# Patient Record
Sex: MALE | Race: WHITE | NOT HISPANIC OR LATINO
[De-identification: names, ages, dates, MRNs, and addresses within clinical notes are randomized per-mention and may not be internally consistent; named-entity substitution may affect disease eponyms.]

---

## 2018-11-07 ENCOUNTER — APPOINTMENT (OUTPATIENT)
Dept: CARDIOLOGY | Facility: CLINIC | Age: 43
End: 2018-11-07
Payer: COMMERCIAL

## 2018-11-07 VITALS
SYSTOLIC BLOOD PRESSURE: 125 MMHG | WEIGHT: 220 LBS | DIASTOLIC BLOOD PRESSURE: 80 MMHG | BODY MASS INDEX: 29.8 KG/M2 | HEIGHT: 72 IN | RESPIRATION RATE: 16 BRPM | HEART RATE: 86 BPM

## 2018-11-07 DIAGNOSIS — Z82.49 FAMILY HISTORY OF ISCHEMIC HEART DISEASE AND OTHER DISEASES OF THE CIRCULATORY SYSTEM: ICD-10-CM

## 2018-11-07 DIAGNOSIS — Z72.820 SLEEP DEPRIVATION: ICD-10-CM

## 2018-11-07 DIAGNOSIS — Z83.438 FAMILY HISTORY OF OTHER DISORDER OF LIPOPROTEIN METABOLISM AND OTHER LIPIDEMIA: ICD-10-CM

## 2018-11-07 DIAGNOSIS — Z78.9 OTHER SPECIFIED HEALTH STATUS: ICD-10-CM

## 2018-11-07 DIAGNOSIS — E53.8 DEFICIENCY OF OTHER SPECIFIED B GROUP VITAMINS: ICD-10-CM

## 2018-11-07 DIAGNOSIS — Z87.891 PERSONAL HISTORY OF NICOTINE DEPENDENCE: ICD-10-CM

## 2018-11-07 PROCEDURE — 93000 ELECTROCARDIOGRAM COMPLETE: CPT

## 2018-11-07 PROCEDURE — 99244 OFF/OP CNSLTJ NEW/EST MOD 40: CPT

## 2018-11-07 RX ORDER — CYANOCOBALAMIN 1000 UG/ML
1000 INJECTION INTRAMUSCULAR; SUBCUTANEOUS
Qty: 2 | Refills: 0 | Status: DISCONTINUED | COMMUNITY
Start: 2018-04-19

## 2018-11-07 RX ORDER — ALBUTEROL SULFATE 90 UG/1
108 (90 BASE) AEROSOL, METERED RESPIRATORY (INHALATION) TWICE DAILY
Refills: 3 | Status: ACTIVE | COMMUNITY
Start: 2018-01-16

## 2018-11-07 RX ORDER — ASCORBIC ACID 500 MG
TABLET ORAL
Refills: 0 | Status: ACTIVE | COMMUNITY

## 2018-11-07 RX ORDER — CHOLECALCIFEROL (VITAMIN D3) 25 MCG
TABLET ORAL
Refills: 0 | Status: ACTIVE | COMMUNITY

## 2018-11-07 RX ORDER — UBIDECARENONE 50 MG
CAPSULE ORAL
Refills: 0 | Status: ACTIVE | COMMUNITY

## 2018-11-07 RX ORDER — CHOLECALCIFEROL (VITAMIN D3) 50 MCG
5000 TABLET ORAL DAILY
Refills: 0 | Status: ACTIVE | COMMUNITY

## 2018-11-07 RX ORDER — ALBUTEROL 90 MCG
AEROSOL (GRAM) INHALATION
Refills: 0 | Status: ACTIVE | COMMUNITY

## 2018-11-07 RX ORDER — MULTIVITAMIN
TABLET ORAL DAILY
Refills: 0 | Status: ACTIVE | COMMUNITY

## 2018-11-07 RX ORDER — RANITIDINE 300 MG/1
300 TABLET ORAL DAILY
Refills: 0 | Status: ACTIVE | COMMUNITY
Start: 2018-10-12

## 2018-11-07 RX ORDER — BUDESONIDE AND FORMOTEROL FUMARATE DIHYDRATE 80; 4.5 UG/1; UG/1
80-4.5 AEROSOL RESPIRATORY (INHALATION)
Qty: 10 | Refills: 0 | Status: DISCONTINUED | COMMUNITY
Start: 2018-01-16

## 2018-11-09 NOTE — HISTORY OF PRESENT ILLNESS
[FreeTextEntry1] : The patient presents here for initial cardiac evaluation. He denies any prior history of cardiac disease.  He has a remote history of tobacco use, 17-pack years, but stopped about 7 years ago. He does have some hyperlipidemia.  He is worried about his heart because many of his coworkers have had cardiac problems.  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   He has some occasional chest tightness that lasts seconds, is nonexertional, nonradiating, and is not associated with any other symptoms.  Where he has no regular exercise regimen, he works fairly strenuously at work.    There is no other known cardiac history.

## 2018-11-09 NOTE — ASSESSMENT
[FreeTextEntry1] : ECG reveals normal sinus rhythm at 86 bpm.  Poor R wave progression anteriorly.  No significant ST-T wave changes.   Laboratory data from 1/18/18 revealed a cholesterol of 236, HDL 66, , triglycerides 85, and hemoglobin 14.7.    Electrolytes and LFTs are normal.    Impression:  This is a 43-year-old male with some degree of exhaustion because of poor sleep hygiene.  He has chest tightness that seems to be noncardiac in nature.  His ECG shows some nonspecific changes.  He does have substantial hyperlipidemia.    His BMI is 29.8 and as such is nearly obese.   I discussed all of the above with him including the need for weight-loss.  His smoking history is at least six years ago.  Recommendations would include: 1.  Exercise stress test. 2.  Radical adjustment of his dietary program as well as some regular exercise and reassessment of his lipids.   3.  We will regroup after his exercise stress test.  If symptoms of chest tightness become exertional in any way he is to let me know.  Results of the testing will be forwarded under separate cover.

## 2018-12-04 ENCOUNTER — APPOINTMENT (OUTPATIENT)
Dept: CARDIOLOGY | Facility: CLINIC | Age: 43
End: 2018-12-04
Payer: COMMERCIAL

## 2018-12-04 PROCEDURE — 93015 CV STRESS TEST SUPVJ I&R: CPT

## 2019-01-08 ENCOUNTER — APPOINTMENT (OUTPATIENT)
Dept: CARDIOLOGY | Facility: CLINIC | Age: 44
End: 2019-01-08
Payer: COMMERCIAL

## 2019-01-08 VITALS — SYSTOLIC BLOOD PRESSURE: 120 MMHG | RESPIRATION RATE: 16 BRPM | DIASTOLIC BLOOD PRESSURE: 60 MMHG | HEIGHT: 72 IN

## 2019-01-08 PROCEDURE — 93000 ELECTROCARDIOGRAM COMPLETE: CPT

## 2019-01-08 PROCEDURE — 99214 OFFICE O/P EST MOD 30 MIN: CPT

## 2019-01-08 NOTE — ASSESSMENT
[FreeTextEntry1] :   Laboratory data from 1/18/18 revealed a cholesterol of 236, HDL 66, , triglycerides 85, and hemoglobin 14.7.    Electrolytes and LFTs are normal.  \par \par  Exercise stress test 5/4/18:\par 11 minutes of exercise (12 Mets).\par Peak HR = 173 (98%).\par No significant symptoms\par No ischemic ECG changes\par No arrhythmias and a normal blood pressure response.\par \par  Impression:  \par 1. This is a 43-year-old male with some degree of exhaustion because of poor sleep hygiene. \par     He has chest tightness that seems to be noncardiac in nature. \par     Stress testing showing normal exercise tolerance and absence of any exercise-induced ischemia or     arrhythmia.\par 2. His ECG shows some nonspecific changes. \par 3. He does have substantial hyperlipidemia. \par  4.  His BMI is 29.8 and as such is nearly obese.\par \par \par \par Recommendations would include:\par  1   Repeat Lipids next month and statin therapy if its not substantially lower\par         I discussed all of the above with him including the need for weight-loss.   \par 2.  Radical adjustment of his dietary program as well as some regular exercise and reassessment of his lipids.   3. 3.  If symptoms of chest tightness become exertional in any way he is to let me know.

## 2019-01-08 NOTE — HISTORY OF PRESENT ILLNESS
[FreeTextEntry1] : The patient presents here for initial cardiac evaluation. He denies any prior history of cardiac disease.  He has a remote history of tobacco use, 17-pack years, but stopped about 7 years ago. He does have some hyperlipidemia.  He is worried about his heart because many of his coworkers have had cardiac problems. 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 \par   He has some occasional chest tightness that lasts seconds, is nonexertional, nonradiating, and is not associated with any other symptoms.  \par While he has no regular exercise regimen, he works fairly strenuously at work.    There is no other known cardiac history.   Now here to review the results of his testing

## 2019-06-27 ENCOUNTER — APPOINTMENT (OUTPATIENT)
Dept: CARDIOLOGY | Facility: CLINIC | Age: 44
End: 2019-06-27
Payer: COMMERCIAL

## 2019-06-27 ENCOUNTER — RECORD ABSTRACTING (OUTPATIENT)
Age: 44
End: 2019-06-27

## 2019-06-27 VITALS
RESPIRATION RATE: 16 BRPM | HEART RATE: 83 BPM | HEIGHT: 72 IN | SYSTOLIC BLOOD PRESSURE: 110 MMHG | DIASTOLIC BLOOD PRESSURE: 60 MMHG | WEIGHT: 215 LBS | BODY MASS INDEX: 29.12 KG/M2

## 2019-06-27 PROCEDURE — 99214 OFFICE O/P EST MOD 30 MIN: CPT

## 2019-06-27 PROCEDURE — 93000 ELECTROCARDIOGRAM COMPLETE: CPT

## 2019-06-27 RX ORDER — BUDESONIDE AND FORMOTEROL FUMARATE DIHYDRATE 160; 4.5 UG/1; UG/1
160-4.5 AEROSOL RESPIRATORY (INHALATION)
Refills: 0 | Status: ACTIVE | COMMUNITY
Start: 2019-04-16

## 2019-06-27 RX ORDER — OLOPATADINE HYDROCHLORIDE 665 UG/1
0.6 SPRAY, METERED NASAL
Refills: 0 | Status: ACTIVE | COMMUNITY
Start: 2019-01-21

## 2019-06-27 NOTE — ASSESSMENT
[FreeTextEntry1] : ECG- No ECG obtained during this office visit\par \par   Laboratory data 6/24/2019\par Chol           243\par HDL            58\par LDL           140\par TRI            315\par \par AST 24\par ALT 32\par \par Exercise stress test 5/4/18:\par 11 minutes of exercise (12 Mets).\par Peak HR = 173 (98%).\par No significant symptoms\par No ischemic ECG changes\par No arrhythmias and a normal blood pressure response.\par \par  Impression:  \par 1. This is a 43-year-old male with some degree of exhaustion because of poor sleep hygiene. \par 2. Stress testing showing normal exercise tolerance and absence of any exercise-induced ischemia or     arrhythmia.\par 3. He does have substantial hyperlipidemia and currently not on any statins. There is a familial history, his LDL remains elevated and is 140, total cholesterol 243. \par 4.  His BMI is 29.16 \par 5. Blood pressure well controlled, in office 110/60\par \par Recommendations would include:\par  1   He will be started on Atorvastatin 20 mg QD for lipid control. A repeat lipid and hepatic panel will be rechecked in 3 months . If his numbers show improvement we will then recheck his levels every 6 months moving forward. He has been educated on the side effects that can result from taking a statin such as muscle discomfort/cramping. \par 2.  Radical adjustment of his dietary program as well as some regular exercise and reassessment of his lipids.   \par 3  If symptoms of chest tightness become exertional in any way he is to let me know. \par \par clinical follow up in 6 months

## 2019-06-27 NOTE — HISTORY OF PRESENT ILLNESS
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  This was found to be noncardiac in nature as his stress test was negative for ischemia or arrhythmia. \par \par Since his initial episode of chest discomfort he denies any reoccurrence ,shortness of breath, edema, or PND\par \par There is  no regular exercise regimen, but he is physically active

## 2019-06-27 NOTE — REASON FOR VISIT
[FreeTextEntry1] : The patient presents here for cardiac  revaluation. His  medical history includes:\par \par 1. Hyperlipidemia\par 2. Chest discomfort\par 3. Former smoker of 17 pack years, quit 7 years ago.

## 2020-01-30 ENCOUNTER — APPOINTMENT (OUTPATIENT)
Dept: CARDIOLOGY | Facility: CLINIC | Age: 45
End: 2020-01-30
Payer: COMMERCIAL

## 2020-01-30 ENCOUNTER — NON-APPOINTMENT (OUTPATIENT)
Age: 45
End: 2020-01-30

## 2020-01-30 VITALS
SYSTOLIC BLOOD PRESSURE: 115 MMHG | HEART RATE: 96 BPM | BODY MASS INDEX: 27.5 KG/M2 | OXYGEN SATURATION: 98 % | RESPIRATION RATE: 16 BRPM | HEIGHT: 72 IN | DIASTOLIC BLOOD PRESSURE: 60 MMHG | WEIGHT: 203 LBS

## 2020-01-30 PROCEDURE — 99214 OFFICE O/P EST MOD 30 MIN: CPT

## 2020-01-30 PROCEDURE — 93000 ELECTROCARDIOGRAM COMPLETE: CPT

## 2020-01-30 NOTE — ASSESSMENT
[FreeTextEntry1] : ECG- \par  Normal sinus rhythm at 96 beats a minute. Right ventricular conduction delay. Small Q waves inferiorly likely nondiagnostic. Relatively unchanged in comparison with the prior ECG.\par \par \par  Laboratory data \par \par             12/13/2019 6/26/19\par \par Chol           156              243\par HDL            56                 58\par LDL           81                140\par TRI            93\par \par AST 20\par ALT 27\par \par Exercise stress test 5/4/18:\par 11 minutes of exercise (12 Mets).\par Peak HR = 173 (98%).\par No significant symptoms\par No ischemic ECG changes\par No arrhythmias and a normal blood pressure response.\par \par  Impression:  \par 1. This is a 43-year-old male with some degree of exhaustion because of poor sleep hygiene. \par 2. Stress testing showing normal exercise tolerance and absence of any exercise-induced ischemia or     arrhythmia.\par 3. He does have substantial hyperlipidemia which has responded well to Atorvastatin\par    There is a familial history of premature CAD\par 4.  His BMI is 29.16 \par 5. Blood pressure well controlled, in office 110/60\par \par Recommendations would include:\par  1   He will continue on Atorvastatin 20 mg QD for lipid control. A repeat lipid and hepatic panel will be rechecked in 6 months . He has been educated on the side effects that can result from taking a statin such as muscle discomfort/cramping. \par 2.  Radical adjustment of his dietary program as well as some regular exercise and reassessment of his lipids.   \par 3  If symptoms of chest tightness become exertional in any way he is to let me know. \par \par clinical follow up in 6 months

## 2020-01-30 NOTE — HISTORY OF PRESENT ILLNESS
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  This was found to be noncardiac in nature as his stress test was negative for ischemia or arrhythmia. \par \par Since his initial episode of chest discomfort he denies any reoccurrence ,shortness of breath, edema, or PND\par \par There is  no regular exercise regimen, but he is physically active\par \par Has been watching his diet more carefully.\par Has been taking statin regularly and finally obtained followup blood work.

## 2020-08-27 ENCOUNTER — RX RENEWAL (OUTPATIENT)
Age: 45
End: 2020-08-27

## 2020-09-30 ENCOUNTER — NON-APPOINTMENT (OUTPATIENT)
Age: 45
End: 2020-09-30

## 2020-09-30 ENCOUNTER — APPOINTMENT (OUTPATIENT)
Dept: CARDIOLOGY | Facility: CLINIC | Age: 45
End: 2020-09-30
Payer: COMMERCIAL

## 2020-09-30 VITALS
DIASTOLIC BLOOD PRESSURE: 80 MMHG | TEMPERATURE: 97.9 F | SYSTOLIC BLOOD PRESSURE: 118 MMHG | HEIGHT: 72 IN | RESPIRATION RATE: 16 BRPM | BODY MASS INDEX: 27.09 KG/M2 | HEART RATE: 65 BPM | WEIGHT: 200 LBS | OXYGEN SATURATION: 98 %

## 2020-09-30 DIAGNOSIS — K21.9 GASTRO-ESOPHAGEAL REFLUX DISEASE W/OUT ESOPHAGITIS: ICD-10-CM

## 2020-09-30 DIAGNOSIS — Z00.00 ENCOUNTER FOR GENERAL ADULT MEDICAL EXAMINATION W/OUT ABNORMAL FINDINGS: ICD-10-CM

## 2020-09-30 DIAGNOSIS — R07.89 OTHER CHEST PAIN: ICD-10-CM

## 2020-09-30 DIAGNOSIS — J45.909 UNSPECIFIED ASTHMA, UNCOMPLICATED: ICD-10-CM

## 2020-09-30 DIAGNOSIS — E78.5 HYPERLIPIDEMIA, UNSPECIFIED: ICD-10-CM

## 2020-09-30 PROCEDURE — 99214 OFFICE O/P EST MOD 30 MIN: CPT

## 2020-09-30 PROCEDURE — 93000 ELECTROCARDIOGRAM COMPLETE: CPT

## 2020-09-30 NOTE — ASSESSMENT
[FreeTextEntry1] : ECG- SR at 65 bpm and WNL.\par \par \par  Laboratory data 9/28/20\par TC    148\par HDL   64\par LDL   70\par Tri.    67\par AST 19\par ALT 24\par \par Exercise stress test 5/4/18:\par 11 minutes of exercise (12 Mets).\par Peak HR = 173 (98%).\par No significant symptoms\par No ischemic ECG changes\par No arrhythmias and a normal blood pressure response.\par \par  Impression:  \par 1. ECG unchanged and stable with no recurrence of CP\par \par 2. 2018 Stress testing showing normal exercise tolerance and absence of any exercise-induced ischemia or          arrhythmia.\par \par 3. Hx of hyperlipidemia and continues to  respond well to Atorvastatin. \par    There is a familial history of premature CAD.\par \par 4.  His BMI is now 27 with a 3 lb weight loss noted from January.\par \par 5. Blood pressure is excellent. \par \par Recommendations would include:\par  1  Continue on Atorvastatin 20 mg QD for lipid control. Refill has been sent.\par 2.  Diet modifications to be continued and exercise encouraged.\par 3. Repeat blood work in 6 months\par 4. Knows to call with any chest pain or SOB.\par 5. No clinical indication for any further cardiac testing at this point in time. \par \par Due to stable cardiac presentation OV can be made for 1 year

## 2020-09-30 NOTE — HISTORY OF PRESENT ILLNESS
[FreeTextEntry1] : Has made a conscious effort to make better dietary choices , cut back on fast foods and compliant with statin therapy. \par \par Does not exercise but keeps active with his 6 year old. Denies any exertional discomfort. \par \par Has not been exposed to anyone with Covid-19.\par \par Hx of occasional chest tightness first experienced in January of 2019 has not recurred.   \par \par No SOB, edema or palps.\par \par \par \par \par

## 2021-10-06 ENCOUNTER — RX RENEWAL (OUTPATIENT)
Age: 46
End: 2021-10-06

## 2021-10-06 RX ORDER — ATORVASTATIN CALCIUM 20 MG/1
20 TABLET, FILM COATED ORAL
Qty: 90 | Refills: 1 | Status: ACTIVE | COMMUNITY
Start: 2019-06-27 | End: 1900-01-01

## 2022-03-31 ENCOUNTER — APPOINTMENT (OUTPATIENT)
Dept: DERMATOLOGY | Facility: CLINIC | Age: 47
End: 2022-03-31
Payer: COMMERCIAL

## 2022-03-31 PROCEDURE — 17110 DESTRUCTION B9 LES UP TO 14: CPT

## 2022-03-31 PROCEDURE — 99203 OFFICE O/P NEW LOW 30 MIN: CPT | Mod: 25

## 2022-04-19 ENCOUNTER — APPOINTMENT (OUTPATIENT)
Dept: ORTHOPEDIC SURGERY | Facility: CLINIC | Age: 47
End: 2022-04-19

## 2022-04-20 ENCOUNTER — APPOINTMENT (OUTPATIENT)
Dept: ORTHOPEDIC SURGERY | Facility: CLINIC | Age: 47
End: 2022-04-20

## 2022-11-08 ENCOUNTER — APPOINTMENT (OUTPATIENT)
Dept: DERMATOLOGY | Facility: CLINIC | Age: 47
End: 2022-11-08

## 2022-11-08 PROCEDURE — 17110 DESTRUCTION B9 LES UP TO 14: CPT

## 2022-11-08 PROCEDURE — 99212 OFFICE O/P EST SF 10 MIN: CPT | Mod: 25

## 2023-04-04 ENCOUNTER — APPOINTMENT (OUTPATIENT)
Dept: DERMATOLOGY | Facility: CLINIC | Age: 48
End: 2023-04-04
Payer: COMMERCIAL

## 2023-04-04 PROCEDURE — 99214 OFFICE O/P EST MOD 30 MIN: CPT

## 2024-04-19 ENCOUNTER — APPOINTMENT (OUTPATIENT)
Dept: DERMATOLOGY | Facility: CLINIC | Age: 49
End: 2024-04-19
Payer: COMMERCIAL

## 2024-04-19 PROCEDURE — 99213 OFFICE O/P EST LOW 20 MIN: CPT

## 2024-07-31 ENCOUNTER — NON-APPOINTMENT (OUTPATIENT)
Age: 49
End: 2024-07-31

## 2024-07-31 ENCOUNTER — APPOINTMENT (OUTPATIENT)
Dept: CARDIOLOGY | Facility: CLINIC | Age: 49
End: 2024-07-31
Payer: COMMERCIAL

## 2024-07-31 VITALS
SYSTOLIC BLOOD PRESSURE: 112 MMHG | HEART RATE: 88 BPM | BODY MASS INDEX: 28.71 KG/M2 | RESPIRATION RATE: 16 BRPM | WEIGHT: 212 LBS | HEIGHT: 72 IN | DIASTOLIC BLOOD PRESSURE: 74 MMHG

## 2024-07-31 DIAGNOSIS — Z78.9 OTHER SPECIFIED HEALTH STATUS: ICD-10-CM

## 2024-07-31 DIAGNOSIS — R07.89 OTHER CHEST PAIN: ICD-10-CM

## 2024-07-31 DIAGNOSIS — E78.5 HYPERLIPIDEMIA, UNSPECIFIED: ICD-10-CM

## 2024-07-31 PROCEDURE — 93000 ELECTROCARDIOGRAM COMPLETE: CPT

## 2024-07-31 PROCEDURE — 99204 OFFICE O/P NEW MOD 45 MIN: CPT

## 2024-07-31 PROCEDURE — G2211 COMPLEX E/M VISIT ADD ON: CPT | Mod: NC

## 2024-07-31 NOTE — DISCUSSION/SUMMARY
[FreeTextEntry1] : 1.  Check echocardiogram and exercise stress test given his chest pain and his EKG. 2.  Check carotid Doppler given the dyslipidemia. 3.  Continue atorvastatin 20 mg daily for dyslipidemia. 4.  Will obtain most recent bloodwork. 5.  No additional cardiac medications at this time. 6.  Encourage patient to maintain healthy habits of diet and exercise. 7.  Follow up here after testing, and will make further recommendations at that time. [EKG obtained to assist in diagnosis and management of assessed problem(s)] : EKG obtained to assist in diagnosis and management of assessed problem(s)

## 2024-07-31 NOTE — HISTORY OF PRESENT ILLNESS
[FreeTextEntry1] : Patient presents back to the office today after having not been seen since 2020.  He presents because he is just concerned for his general cardiac health.  He notes some occasional fleeting sharp chest discomfort across his chest that seems to occur at random.  No exertional symptoms at all.  He continues on atorvastatin for dyslipidemia and tolerates it without a problem.  Patient denies chest pain, shortness of breath, palpitations, orthopnea, presyncope, syncope.

## 2024-07-31 NOTE — ASSESSMENT
[FreeTextEntry1] : Exercise stress test December 14, 2018 during which the patient exercised via a Candelario protocol for 11 minutes and 5 seconds, totaling 12 METS.  Peak heart rate achieved was 173 beats minute, representing 98% of age-predicted maximum.  Blood pressure response to exercise was normal.  EKG showed no evidence of ischemia or arrhythmia with exercise.  EKG: Sinus rhythm with no significant ST or T wave changes.  Possible incomplete right bundle branch block.  Poor R-rate progression.  Low voltage.  49-year-old man with a past medical history of dyslipidemia who presents to me for evaluation given his concerns for his cardiovascular health.  Patient is essentially asymptomatic this time but is having some random chest pain.  I have recommended stress testing to further evaluate.  Also given his EKG which shows a possible incomplete right bundle branch block and poor R-rate progression.  He will also do an echocardiogram.  Have also recommended a carotid Doppler given his dyslipidemia.  He had recent blood work with his primary which I will get a copy.  Blood pressure appears to be well-controlled.

## 2024-08-02 ENCOUNTER — APPOINTMENT (OUTPATIENT)
Dept: CARDIOLOGY | Facility: CLINIC | Age: 49
End: 2024-08-02

## 2024-09-23 ENCOUNTER — APPOINTMENT (OUTPATIENT)
Dept: CARDIOLOGY | Facility: CLINIC | Age: 49
End: 2024-09-23
Payer: COMMERCIAL

## 2024-09-23 PROCEDURE — 93015 CV STRESS TEST SUPVJ I&R: CPT

## 2024-09-24 ENCOUNTER — APPOINTMENT (OUTPATIENT)
Dept: CARDIOLOGY | Facility: CLINIC | Age: 49
End: 2024-09-24
Payer: COMMERCIAL

## 2024-09-24 PROCEDURE — 93306 TTE W/DOPPLER COMPLETE: CPT

## 2024-09-24 PROCEDURE — 93880 EXTRACRANIAL BILAT STUDY: CPT

## 2024-10-01 ENCOUNTER — APPOINTMENT (OUTPATIENT)
Dept: CARDIOLOGY | Facility: CLINIC | Age: 49
End: 2024-10-01
Payer: COMMERCIAL

## 2024-10-01 VITALS
HEART RATE: 69 BPM | WEIGHT: 206 LBS | BODY MASS INDEX: 27.9 KG/M2 | RESPIRATION RATE: 16 BRPM | SYSTOLIC BLOOD PRESSURE: 123 MMHG | DIASTOLIC BLOOD PRESSURE: 84 MMHG | HEIGHT: 72 IN

## 2024-10-01 DIAGNOSIS — E78.5 HYPERLIPIDEMIA, UNSPECIFIED: ICD-10-CM

## 2024-10-01 PROCEDURE — G2211 COMPLEX E/M VISIT ADD ON: CPT | Mod: NC

## 2024-10-01 PROCEDURE — 99213 OFFICE O/P EST LOW 20 MIN: CPT

## 2024-10-01 NOTE — DISCUSSION/SUMMARY
[FreeTextEntry1] : 1.  No additional cardiac testing at this time. 2.  Continue atorvastatin 20 mg daily for dyslipidemia. 3.  Encourage patient to maintain healthy habits of diet and exercise. 4.  No need for additional routine cardiac follow-up at this time but would suggest follow-up in the next 3 to 5 years.  I will be available as needed.

## 2024-10-01 NOTE — HISTORY OF PRESENT ILLNESS
[FreeTextEntry1] : Patient presents back to the office today feeling very well and offering no complaints.  He continues to exercise to a high level without any real symptoms or limitations.  He does not report any significant chest pain at all.  Patient denies shortness of breath, palpitations, orthopnea, presyncope, syncope.

## 2024-10-01 NOTE — ASSESSMENT
[FreeTextEntry1] : Exercise stress test December 14, 2018 during which the patient exercised via a Candelario protocol for 11 minutes and 5 seconds, totaling 12 METS.  Peak heart rate achieved was 173 beats minute, representing 98% of age-predicted maximum.  Blood pressure response to exercise was normal.  EKG showed no evidence of ischemia or arrhythmia with exercise.  Carotid Doppler July 31, 2024 shows no plaque or stenosis bilaterally.  Echocardiogram September 24, 2024 demonstrates left ventricle normal size and function with ejection fraction of 55 to 60%.  No significant structural abnormalities noted.  Exercise stress test September 23, 2024 during which the patient exercised via a Candelario protocol for 12 minutes and 1 second, totaling 13 METS.  Peak heart rate achieved was 171 bpm, representing 100% of age-predicted maximum.  Blood pressure response exercise was normal.  EKG showed no evidence of ischemia or arrhythmia with exercise.  49-year-old man with a past medical history of dyslipidemia who presented to me for evaluation given his concerns for his cardiovascular health.  Patient is asymptomatic at this time and feels well.  Blood pressure is controlled.  Cardiac testing is unremarkable including a carotid showing no significant disease, echocardiogram showing normal EF and no structural disease and stress testing showing an excellent exercise capacity and no evidence of ischemia or arrhythmia.  No need for any additional cardiac workup at this time.  His lipids are controlled on current dose of atorvastatin and I would continue with that.  He is encouraged to continue his efforts with diet and exercise as well.

## 2024-10-25 ENCOUNTER — NON-APPOINTMENT (OUTPATIENT)
Age: 49
End: 2024-10-25

## 2024-10-29 ENCOUNTER — NON-APPOINTMENT (OUTPATIENT)
Age: 49
End: 2024-10-29

## 2024-12-25 PROBLEM — F10.90 ALCOHOL USE: Status: ACTIVE | Noted: 2018-11-07

## 2025-04-29 ENCOUNTER — APPOINTMENT (OUTPATIENT)
Dept: DERMATOLOGY | Facility: CLINIC | Age: 50
End: 2025-04-29